# Patient Record
Sex: MALE
[De-identification: names, ages, dates, MRNs, and addresses within clinical notes are randomized per-mention and may not be internally consistent; named-entity substitution may affect disease eponyms.]

---

## 2018-02-28 ENCOUNTER — HOSPITAL ENCOUNTER (EMERGENCY)
Dept: HOSPITAL 17 - PHED | Age: 28
Discharge: HOME | End: 2018-02-28
Payer: COMMERCIAL

## 2018-02-28 VITALS — SYSTOLIC BLOOD PRESSURE: 108 MMHG | DIASTOLIC BLOOD PRESSURE: 60 MMHG

## 2018-02-28 VITALS
DIASTOLIC BLOOD PRESSURE: 54 MMHG | OXYGEN SATURATION: 98 % | HEART RATE: 71 BPM | RESPIRATION RATE: 16 BRPM | TEMPERATURE: 99.8 F | SYSTOLIC BLOOD PRESSURE: 122 MMHG

## 2018-02-28 VITALS — RESPIRATION RATE: 16 BRPM

## 2018-02-28 VITALS — BODY MASS INDEX: 21.72 KG/M2 | WEIGHT: 143.3 LBS | HEIGHT: 68 IN

## 2018-02-28 DIAGNOSIS — S40.811A: ICD-10-CM

## 2018-02-28 DIAGNOSIS — S40.021A: Primary | ICD-10-CM
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
## 2018-02-28 DIAGNOSIS — Z23: ICD-10-CM

## 2018-02-28 DIAGNOSIS — Z87.891: ICD-10-CM

## 2018-02-28 PROCEDURE — 90471 IMMUNIZATION ADMIN: CPT

## 2018-02-28 PROCEDURE — 99283 EMERGENCY DEPT VISIT LOW MDM: CPT

## 2018-02-28 PROCEDURE — 73090 X-RAY EXAM OF FOREARM: CPT

## 2018-02-28 PROCEDURE — 90714 TD VACC NO PRESV 7 YRS+ IM: CPT

## 2018-02-28 PROCEDURE — 73060 X-RAY EXAM OF HUMERUS: CPT

## 2018-02-28 PROCEDURE — 73080 X-RAY EXAM OF ELBOW: CPT

## 2018-02-28 PROCEDURE — 96372 THER/PROPH/DIAG INJ SC/IM: CPT

## 2018-02-28 NOTE — RADRPT
EXAM DATE/TIME:  02/28/2018 03:07 

 

[facility] COMPARISON:     

No previous studies available for comparison.

 

                     

INDICATIONS :     

Right arm pain after longboard injury.

                     

 

MEDICAL HISTORY :     

None.          

 

SURGICAL HISTORY :     

None.   

 

ENCOUNTER:     

Initial                                        

 

ACUITY:     

1 day      

 

PAIN SCORE:     

10/10

 

LOCATION:     

Right  arm

 

FINDINGS:     

Two view examination of the right humerus demonstrates no evidence of fracture or dislocation.  Bony 
mineralization is normal.  The soft tissue structures are intact.

 

CONCLUSION:     

1. No acute fracture or dislocation.

 

 

 

 Gautam Zepeda MD on February 28, 2018 at 3:28           

Board Certified Radiologist.

 This report was verified electronically.

## 2018-02-28 NOTE — RADRPT
EXAM DATE/TIME:  02/28/2018 01:41 

 

[facility] COMPARISON:     

No previous studies available for comparison.

 

                     

INDICATIONS :     

Right elbow pain from longboard injury.

                     

 

MEDICAL HISTORY :     

None.          

 

SURGICAL HISTORY :     

None.   

 

ENCOUNTER:     

Initial                                        

 

ACUITY:     

1 day      

 

PAIN SCORE:     

10/10

 

LOCATION:     

Right  elbow.

 

FINDINGS:     

Multiple view examination of the right elbow demonstrates no soft tissue swelling, joint effusion, or
 fracture.  The osseous structures are in normal alignment.  Bony mineralization is normal.

 

CONCLUSION:     

1. No acute fracture or dislocation.

 

 

 

 

 Gautam Zepeda MD on February 28, 2018 at 2:13           

Board Certified Radiologist.

 This report was verified electronically.

## 2018-02-28 NOTE — RADRPT
EXAM DATE/TIME:  02/28/2018 01:41 

 

[facility] COMPARISON:     

No previous studies available for comparison.

 

                     

INDICATIONS :     

Right forearm pain from longboard injury.

                     

 

MEDICAL HISTORY :     

None.          

 

SURGICAL HISTORY :     

None.   

 

ENCOUNTER:     

Initial                                        

 

ACUITY:     

1 day      

 

PAIN SCORE:     

10/10

 

LOCATION:     

Right proximal forearm.

 

FINDINGS:     

Two view examination of the right forearm demonstrates no evidence of fracture or dislocation.  Bony 
mineralization is normal.  The soft tissue structures are intact.

 

CONCLUSION:     

1. No acute fracture or dislocation.

 

 

 

 Gautam Zepeda MD on February 28, 2018 at 2:14           

Board Certified Radiologist.

 This report was verified electronically.

## 2018-02-28 NOTE — PD
HPI


Chief Complaint:  Injury


Time Seen by Provider:  03:02


Travel History


International Travel<30 days:  No


Contact w/Intl Traveler<30days:  No


Traveled to known affect area:  No





History of Present Illness


HPI


The patient is a 27-year-old male that was on a long board and landed on his 

right arm/elbow at 7 PM yesterday.  He rolled onto the ground.  He complains of 

persistent pain at, above and below the elbow.  He denies any numbness or 

weakness and is able to move and feel his hand normally.  He complains of a 

sharp, shooting pain of 8/10.





PFSH


Past Medical History


Asthma:  Yes (as a kid)


Diminished Hearing:  No


Tetanus Vaccination:  Unknown


Influenza Vaccination:  No


Pregnant?:  Not Pregnant





Past Surgical History


Surgical History:  No Previous Surgery





Social History


Alcohol Use:  Yes (occasional)


Tobacco Use:  No (quit 3 yrs ago)


Substance Use:  Yes (marijuana)





Allergies-Medications


(Allergen,Severity, Reaction):  


Coded Allergies:  


     No Known Allergies (Unverified , 2/28/18)


Reported Meds & Prescriptions





Reported Meds & Active Scripts


Active


Percocet (Oxycodone-Acetaminophen) 5-325 mg Tab 1-2 Tab PO Q6H PRN








Review of Systems


Except as stated in HPI:  all other systems reviewed are Neg





Physical Exam


Narrative


GENERAL: Well-nourished, well-developed patient in moderate apparent distress 

with his right arm discomfort.  His vital signs show blood pressure 122/54 with 

temperature 99.8 but are otherwise normal.


SKIN: Focused skin assessment warm/dry.


HEAD: Normocephalic.


EYES: No scleral icterus. No injection or drainage. 


NECK: Supple, trachea midline. No JVD or lymphadenopathy.


CARDIOVASCULAR: Regular rate and rhythm without murmurs, gallops, or rubs. 


RESPIRATORY: Breath sounds equal bilaterally. No accessory muscle use.


GASTROINTESTINAL: Abdomen soft, non-tender, nondistended. 


MUSCULOSKELETAL: No cyanosis, or edema.  There is tenderness at and above the 

elbow but no bony deformity is present.  There are no contusions present.  

There is no swelling present.  He has good capillary refill and pinprick 

sensation on the right fingers.


BACK: Nontender without obvious deformity. No CVA tenderness.





Data


Data


Last Documented VS





Vital Signs








  Date Time  Temp Pulse Resp B/P (MAP) Pulse Ox O2 Delivery O2 Flow Rate FiO2


 


2/28/18 01:21   18  98 Room Air  


 


2/28/18 00:41 99.8 71  122/54 (76)    








Orders





 Orders


Elbow, Complete (4 Vws) (2/28/18 )


Forearm (2vws) (2/28/18 )


Ketorolac Inj (Toradol Inj) (2/28/18 03:15)


Humerus (Min 2vws) (2/28/18 03:03)


Oxycodone-Acetamin 7.5-325 Mg (Percocet (2/28/18 03:45)


Splint Or Brace Apply/Monitor (2/28/18 03:41)


Wound Care (2/28/18 03:42)


Tetanus/Diphtheria Tox Adult (Tetanus/Di (2/28/18 03:45)








MDM


Medical Decision Making


Medical Screen Exam Complete:  Yes


Emergency Medical Condition:  Yes


Medical Record Reviewed:  Yes


Interpretation(s)


X-rays of the right elbow show no acute fracture or dislocation.  X-rays of the 

right forearm show no acute fracture or dislocation.  X-rays of the right 

humerus show no acute fracture or dislocation.


Differential Diagnosis


Fracture humerus, fracture elbow, fracture forearm, compartment syndrome-highly 

unlikely, contusion humerus, contusion elbow, contusion forearm


Narrative Course


The patient contusion/abrasion of the right arm.  His pain is much more than 

one would expect for a simple contusion/abrasion of the right arm.  If not 

better in 24-48 hours, he should see an orthopedic physician.  He is given 

Percocet 5 for pain and a sling.  He is given work/school excuses.





Diagnosis





 Primary Impression:  


 Contusion of right arm


 Additional Impression:  


 Abrasion of right arm





***Additional Instructions:  


As we discussed, if your pain persist beyond a day or 2 you should follow-up 

with orthopedics and get a second opinion.  This pain is much more than one 

would expect for simple contusion/abrasion of the arm.


***Med/Other Pt SpecificInfo:  Prescription(s) given


Scripts


Oxycodone-Acetaminophen (Percocet) 5-325 mg Tab


1-2 TAB PO Q6H Y for PAIN, #28 TAB 0 Refills


   Prov: Vickey Ricardo MD         2/28/18


Disposition:  01 DISCHARGE HOME


Condition:  Stable











Vickey Ricardo MD Feb 28, 2018 03:10